# Patient Record
Sex: FEMALE | Race: WHITE | Employment: FULL TIME | URBAN - METROPOLITAN AREA
[De-identification: names, ages, dates, MRNs, and addresses within clinical notes are randomized per-mention and may not be internally consistent; named-entity substitution may affect disease eponyms.]

---

## 2017-08-24 ENCOUNTER — OFFICE VISIT (OUTPATIENT)
Dept: AUDIOLOGY | Facility: CLINIC | Age: 57
End: 2017-08-24

## 2017-08-24 DIAGNOSIS — H90.3 SENSORINEURAL HEARING LOSS, BILATERAL: Primary | ICD-10-CM

## 2017-08-24 NOTE — PROGRESS NOTES
AUDIOLOGY REPORT    BACKGROUND INFORMATION: Dr. Rosaline Pace implanted Zoila Perez with a right  [employer] Sychrony cochlear implant (CI) on 4/20/16 due to bilateral severe to profound sensorineural hearing loss and lack of benefit from hearing aids.  The patient is being seen for programming and speech perception testing 8/24/2017 was seen in Audiology at the [facility]. She was accompanied by her , Ole.    PATIENT REPORT: Zoila reported when people walk away from her or when she is outside that voices sound muffled. She also reported continued issues in noise.    FITTING SESSION: Dr. Rosaline Pace, cochlear implant surgeon, ordered today's appointment. The patient came to the clinic for adjustment to the programs in the external speech processor and for assessment of the external components of the cochlear implant system. These components provide power and data to the internal device. Sound is only heard once the external portion is activated. Postoperative treatment, including device fitting and adjustment, audiologic assessments, and training are required at regular intervals. Testing can include electropsychophysical measures of threshold, comfort, and loudness balancing, which are completed to update the program.    Processor type: [employer] and [employer]  Headpiece type: D-Coil  Magnet strength: 2    TEST RESULTS:   Tympanograms: Did not test today  Electrode Impedances: Stable and within normal limits  Neural Response Testing: No tested today  Facial Stimulation: Absent  Tinnitus: Absent  Balance Problems: Absent  Pain/Discomfort: Absent  Strategies Tried: FS4-p    Programs:  1. MCLs and T's measured.   2. Natural  3. Adaptive    Number of Channels per Program: 11 (Electrode 12 disabled due to abnormal loudness growth)    COMMENTS: MCL's were measured using loudness scaling across the array. There were no significant changes in MCL levels. I remeasured T  levels on electrodes 5-11 (leaving the fine structured channels 1-4 at 0). T levels increased significantly. I dropped the measured T levels by 6%. In live voice she reported that the clarity was better and that her voice sounded more natural. I increased the MCL levels by 3% globally which she reported was comfortable.    We reviewed new Automatic sound management programs which I programmed in to slots 2 and 3.     SUMMARY AND RECOMMENDATIONS: Zoila was seen for cochlear implant programming today. She will return in 12 months or sooner if concerns arise. Please call this clinic with questions regarding these results or recommendations.    Manoj Fuentes  Licensed Audiologist  [place] License #[id number]

## 2017-08-24 NOTE — MR AVS SNAPSHOT
After Visit Summary   8/24/2017    Zoila Perez    MRN: [id number]           Patient Information     Date Of Birth          1960        Visit Information        Provider Department      8/24/2017 11:00 AM Marlene Tang, [facility] Audiology        Today's Diagnoses     Sensorineural hearing loss, bilateral    -  1       Follow-ups after your visit        Who to contact     Please call your clinic at [phone] to:    Ask questions about your health    Make or cancel appointments    Discuss your medicines    Learn about your test results    Speak to your doctor   If you have compliments or concerns about an experience at your clinic, or if you wish to file a complaint, please contact [facility] Patient Relations at [phone] or email us at [email or web address]         Additional Information About Your Visit        MyChart Information     [employer]t gives you secure access to your electronic health record. If you see a primary care provider, you can also send messages to your care team and make appointments. If you have questions, please call your primary care clinic.  If you do not have a primary care provider, please call [phone] and they will assist you.      [employer] is an electronic gateway that provides easy, online access to your medical records. With [employer], you can request a clinic appointment, read your test results, renew a prescription or communicate with your care team.     To access your existing account, please contact your [facility] or call [phone] for assistance.        Care EveryWhere ID     This is your Care EveryWhere ID. This could be used by other organizations to access your [place] medical records  [id number]         Blood Pressure from Last 3 Encounters:   04/20/16 116/78    Weight from Last 3 Encounters:   04/20/16 79.4 kg (175 lb)              We Performed the Following     RT:  Diagnostic Analysis of CI 7 yrs & over, Subsequent Programming   (34291)        Primary Care Provider    None Specified       No primary provider on file.        Equal Access to Services     KATY MARCANO : Hadii aad ku hadsuhamariajose Alliezaire, murrayjimi colbertdavidha, quintin jenniferkellie kaba, mainor ernain hayaan nataliyajuana guzman laAutumncorie robles. So [facility] [phone].    ATENCIÓN: Si habla español, tiene a [place] disposición servicios gratuitos de asistencia lingüística. Llame al [phone].    We comply with applicable federal civil rights laws and [place] laws. We do not discriminate on the basis of race, color, national origin, age, disability sex, sexual orientation or gender identity.            Thank you!     Thank you for choosing [facility] AUDIOLOGY  for your care. Our goal is always to provide you with excellent care. Hearing back from our patients is one way we can continue to improve our services. Please take a few minutes to complete the written survey that you may receive in the mail after your visit with us. Thank you!             Your Updated Medication List - Protect others around you: Learn how to safely use, store and throw away your medicines at www.disposemymeds.org.          This list is accurate as of: 8/24/17  2:30 PM.  Always use your most recent med list.                   Brand Name Dispense Instructions for use Diagnosis    Calcium Carb-Cholecalciferol 600-800 MG-UNIT Tabs      Take by mouth daily        LOSARTAN POTASSIUM PO      Take by mouth daily        MULTI-VITAMIN DAILY PO      Take by mouth daily Adult 50        OMEGA-3 FISH OIL PO      Take 1,200 Units by mouth daily        VITAMIN D3 PO      Take 1,000 Units by mouth daily

## 2017-12-31 ENCOUNTER — HEALTH MAINTENANCE LETTER (OUTPATIENT)
Age: 57
End: 2017-12-31

## 2020-03-10 ENCOUNTER — HEALTH MAINTENANCE LETTER (OUTPATIENT)
Age: 60
End: 2020-03-10

## 2020-12-27 ENCOUNTER — HEALTH MAINTENANCE LETTER (OUTPATIENT)
Age: 60
End: 2020-12-27

## 2021-04-24 ENCOUNTER — HEALTH MAINTENANCE LETTER (OUTPATIENT)
Age: 61
End: 2021-04-24

## 2021-07-06 ENCOUNTER — TELEPHONE (OUTPATIENT)
Dept: AUDIOLOGY | Facility: CLINIC | Age: 61
End: 2021-07-06
Payer: COMMERCIAL

## 2021-07-06 NOTE — TELEPHONE ENCOUNTER
Health Call Center    Phone Message    May a detailed message be left on voicemail: yes     Reason for Call: Other: Pt's spouse calling to report Pt is having difficulty w/ cochlear implant. He states they have reached out to the  and were redirected to speak w/ Dr. Tang. Pt does not want to be seen in clinic at this time, and was last seen in 2017. Please call Pt at mobile number for f/up.     Action Taken: Message routed to:  [facility] ENT    Travel Screening: Not Applicable

## 2021-07-15 ENCOUNTER — OFFICE VISIT (OUTPATIENT)
Dept: AUDIOLOGY | Facility: CLINIC | Age: 61
End: 2021-07-15
Payer: COMMERCIAL

## 2021-07-15 DIAGNOSIS — H90.3 SENSORINEURAL HEARING LOSS, BILATERAL: Primary | ICD-10-CM

## 2021-07-15 NOTE — PROGRESS NOTES
AUDIOLOGY REPORT    BACKGROUND INFORMATION: Dr. Rosaline Pace implanted Zoila Perez with a right  MED-EL Sychrony cochlear implant (CI) on 4/20/16 due to bilateral severe to profound sensorineural hearing loss and lack of benefit from hearing aids.  The patient is being seen for troubleshooting on 7/15/21 in Audiology at [facility]. She was accompanied by her , Ole.    PATIENT REPORT: Zoila reported that recently she was noticing that her Sonnet was making crackling sounds and then it stopped completely. She is using her Antwon full time. She called [employer] and tried to change the cable which did not work.    FITTING SESSION: Dr. Rosaline Pace, cochlear implant surgeon, ordered today's appointment. The patient came to the clinic for adjustment to the programs in the external speech processor and for assessment of the external components of the cochlear implant system. These components provide power and data to the internal device. Sound is only heard once the external portion is activated. Postoperative treatment, including device fitting and adjustment, audiologic assessments, and training are required at regular intervals. Testing can include electropsychophysical measures of threshold, comfort, and loudness balancing, which are completed to update the program.    Processor type: Sonnet and [place]  Headpiece type: D-Coil  Magnet strength: 2    TEST RESULTS:   Tympanograms: Did not test today  Electrode Impedances: Stable and within normal limits  Neural Response Testing: No tested today  Facial Stimulation: Absent  Tinnitus: Absent  Balance Problems: Absent  Pain/Discomfort: Absent  Strategies Tried: FS4-p    Programs:  1. MCLs and T's measured.   2. Natural  3. Adaptive    Number of Channels per Program: 11 (Electrode 12 disabled due to abnormal loudness growth)    COMMENTS: After troubleshooting all various parts of her Sonnet it was found that her rechargeable  battery adapter was malfunctioning. We discussed that she should contact [facility] to have it replaced, otherwise she could use her disposable battery frame.    We discussed upgrading her equipment and she reported that she did not think her insurance would cover the cost of the upgrade. I encouraged her to contact them to check if cochlear implant equipment is excluded and gave her the billing codes associated with the charges. If they are not excluded I encouraged her to contact [facility]- to discuss the upgrade process. They will think about this and move forward as they see fit.     SUMMARY AND RECOMMENDATIONS: Zoila was seen for cochlear implant troubleshooting today. She will return in 12 months or sooner if concerns arise. Please call this clinic with questions regarding these results or recommendations.    Manoj Fuentes, [facility]  Licensed Audiologist  [place] License #[id number]

## 2021-10-04 ENCOUNTER — HEALTH MAINTENANCE LETTER (OUTPATIENT)
Age: 61
End: 2021-10-04

## 2022-03-20 ENCOUNTER — HEALTH MAINTENANCE LETTER (OUTPATIENT)
Age: 62
End: 2022-03-20

## 2022-05-15 ENCOUNTER — HEALTH MAINTENANCE LETTER (OUTPATIENT)
Age: 62
End: 2022-05-15

## 2022-09-11 ENCOUNTER — HEALTH MAINTENANCE LETTER (OUTPATIENT)
Age: 62
End: 2022-09-11

## 2023-06-03 ENCOUNTER — HEALTH MAINTENANCE LETTER (OUTPATIENT)
Age: 63
End: 2023-06-03

## 2023-07-03 ENCOUNTER — DOCUMENTATION ONLY (OUTPATIENT)
Dept: AUDIOLOGY | Facility: CLINIC | Age: 63
End: 2023-07-03
Payer: COMMERCIAL

## 2023-07-03 NOTE — PROGRESS NOTES
"Letter of Medical necessity submitted to Med , signed by Rosaline Pace MD regarding: \"= [employer] RCB Adapter- Ms. Perez's processor   is not working properly due to the [employer] RCB  Adapter not working properly.\"    -Saumya Thapa 7/3/23  "

## 2023-07-21 NOTE — PROGRESS NOTES
Update: Letter signed by Dr. Pace and emailed to/received by Leda for sending to insurer reg. appeal by Leda Soares - Insurance Support Services with [employer].    -Saumya Thapa 7/20/23    ------------    Received emailed appeal for [employer] denial of RCB adapter for [employer] CI processor which she had requested. Appeal letter asked for signature by Dr. Rosaline Pace.     Saumya SANCHEZ 7/20/23

## 2024-01-04 ENCOUNTER — DOCUMENTATION ONLY (OUTPATIENT)
Dept: AUDIOLOGY | Facility: CLINIC | Age: 64
End: 2024-01-04
Payer: COMMERCIAL

## 2024-01-04 NOTE — PROGRESS NOTES
Letter of Medical Necessity written and routed for signature to Dr. Rosaline Pace and submission to [employer] regarding replacement parts. Attached 7/15/21 audiology clinic notes to show equipment was having issues then and as last clinical documentation.    -Saumya SANCHEZ 1/4/24

## 2024-05-04 ENCOUNTER — HEALTH MAINTENANCE LETTER (OUTPATIENT)
Age: 64
End: 2024-05-04

## 2024-07-13 ENCOUNTER — HEALTH MAINTENANCE LETTER (OUTPATIENT)
Age: 64
End: 2024-07-13

## 2025-06-05 ENCOUNTER — TELEPHONE (OUTPATIENT)
Dept: AUDIOLOGY | Facility: CLINIC | Age: 65
End: 2025-06-05
Payer: COMMERCIAL

## 2025-06-05 NOTE — TELEPHONE ENCOUNTER
"Replied to pt email with info for CI upgrade, possible loaner with [employer] and scheduling appt with audiology. Gave direct # for callback.  --  \"Call could not be completed, try again later\"    Saumya CI Coordinator 6/5/25  "
[facility] Call Center    Phone Message    May a detailed message be left on voicemail: yes     Reason for Call: Other: Pt  Ole called, would like a call back as pt CI processor is not working properly.  Would like a call back from Marlene ANTONIO or Saumya to discuss.   [facility] [place], thanks     Action Taken: Other: AUD    Travel Screening: Not Applicable     Date of Service:                                                                      
Spoke to spouse Ole to explain [employer] recommends upgrading CI to newest processor. Reviewed below steps needed:    Zoila needs appt with audiology for documentation for upgrade submission. Appt scheduled.     2.  Pt should contact [employer] if she wants a loaner in-meantime of waiting for new processor. Explained clinic does not have a loaner to provider. Pt stated she currently has working Antwon but is afraid it is going to die. Provided customer service #    3. Explained [employer] can assist with upgrade via virtual 1:1 session. Scheduled appt for Mon 6/16 9:00am - relayed that info should be sent to their email.        Saumya, CI Coordinator 6/5/25  
good/with stimulation

## 2025-06-10 ENCOUNTER — OFFICE VISIT (OUTPATIENT)
Dept: AUDIOLOGY | Facility: CLINIC | Age: 65
End: 2025-06-10
Payer: COMMERCIAL

## 2025-06-10 DIAGNOSIS — H90.3 SENSORY HEARING LOSS, BILATERAL: Primary | ICD-10-CM

## 2025-06-10 NOTE — PROGRESS NOTES
"AUDIOLOGY REPORT    SUBJECTIVE:   Dr. Rosaline Pace implanted Zoila Perez with a right  MED-EL Sychrony cochlear implant (CI) on 4/20/16 due to bilateral severe to profound sensorineural hearing loss and lack of benefit from hearing aids.  The patient is being seen for troubleshooting on 6/10/25 in Audiology at [facility] [place]. She was accompanied by her , Ole.    Zoila reported that recently she was noticing that her Sonnet was making crackling sounds (\"almost like tractor was in our living room\") and then it stopped completely. She is using her Antwon full time. She called [employer] and tried to fill out the upgrade form, but found the process confusing. She also notices her [place] program sounds softer than her Sonnet, so she would like to change the program on her Antwon to match what she had on her Sonnet.    OBJECTIVE:   Dr. Montague, cochlear implant surgeon, ordered today's appointment. The patient came to the clinic for adjustment to the programs in the external speech processor and for assessment of the external components of the cochlear implant system. These components provide power and data to the internal device. Sound is only heard once the external portion is activated. Postoperative treatment, including device fitting and adjustment, audiologic assessments, and training are required at regular intervals. Testing can include electropsychophysical measures of threshold, comfort, and loudness balancing, which are completed to update the program.    Processor type: Sonnet and Antwon  Headpiece type: D-Coil  Magnet strength: 3    TEST RESULTS:   Tympanograms: Did not test today  Electrode Impedances: Stable and within normal limits  Neural Response Testing: No tested today  Facial Stimulation: Absent  Tinnitus: Absent  Balance Problems: Absent  Pain/Discomfort: Absent  Strategies Tried: FS4-p    Programs: Antwon processor  1. Map Omni from 8/24/17 (Sonnet) to " Antwon    Number of Channels per Program: 11 (Electrode 12 disabled due to abnormal loudness growth)    COMMENTS: After troubleshooting all various parts of her Sonnet it was found that her rechargeable battery adapter and battery frame were malfunctioning. We discussed that she should contact [employer] to have it replaced while she waits for the upgrade processor to arrive. We filled out an upgrade order form today.    ASSESSMENT:   Cochlear implant programming completed today.    PLAN:   It is recommended that Zoila return once she receives the new upgrade kit and schedule an appointment to program the new equipment. Please call this clinic with questions regarding these results or recommendations.    EULALIO DoeA  Audiology Doctoral Extern  [place] #[id number]    I was present with the patient for the entire Audiology appointment, including all procedures/testing performed by the Luke student, and agree with the student s assessment and plan as documented.     Manoj Fuentes, [facility]  Licensed Audiologist  [place] License #[id number]

## 2025-07-08 ENCOUNTER — DOCUMENTATION ONLY (OUTPATIENT)
Dept: AUDIOLOGY | Facility: CLINIC | Age: 65
End: 2025-07-08
Payer: COMMERCIAL

## 2025-07-08 NOTE — PROGRESS NOTES
Written and routed Letter of Medical Necessity to Dr. Zulma Montague for signature and submission to [employer] regarding upgrade to [employer] 3 sound processor. Most recent clinic notes provided.    CY Kerns Coordinator 7/8/25

## 2025-07-09 ENCOUNTER — TELEPHONE (OUTPATIENT)
Dept: AUDIOLOGY | Facility: CLINIC | Age: 65
End: 2025-07-09
Payer: COMMERCIAL

## 2025-07-09 NOTE — TELEPHONE ENCOUNTER
Email communication from patient to managing audiologist & CI coordinator.     Zoila explained that  she's been working through CI upgrade process with [facility] and they said they've tried to contact [facility] clinic. (Clinic has not received anything regarding this patient from [facility])    CI Coordinator contacted [employer] insurance contact Merry Kelley, who routed the Docusign to [facility] (may have been disrupted due to previous MD assignment). LMN was signed & returned by Dr. Montague.     CI Coordinator emailed pt to explain that documentation was completed and she should check back with [facility] in 1-2weeks to ensure process with insurance is moving along. Confirmed with pt/scheduled appts for audiology programming.    Saumya CI Coordinator 7/9/25

## 2025-07-19 ENCOUNTER — HEALTH MAINTENANCE LETTER (OUTPATIENT)
Age: 65
End: 2025-07-19